# Patient Record
Sex: FEMALE | Race: WHITE | Employment: OTHER | URBAN - NONMETROPOLITAN AREA
[De-identification: names, ages, dates, MRNs, and addresses within clinical notes are randomized per-mention and may not be internally consistent; named-entity substitution may affect disease eponyms.]

---

## 2019-06-15 NOTE — PROGRESS NOTES
[place] [facility] Sleep [facility] Critical Care [facility]                                                                                        Sleep Medicine Initial Consultation    [facility]                                             Chief Complaint: Talia Warren is a new sleep consult referred by Jose Lawson for snoring     Chief complaint: Kenisha Harrell is a 79 y. o.oldfemale came for further evaluation regarding her ?sleep apnea  with referral from JUANI Levine- Dr. Meir Dumont, [facility]- Cardiologist.    [place]:    Sleep/Wake schedule:  Usual time to go to bed during the work/regular day of week: 9:00 PM.  Usual time to wake up during the work//regular day of week: 5:30 AM.    Over the weekends her sleep schedule:   [x]phase delayed. 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 She usually falls a sleep in less than: 10 minutes. She takes naps: Yes  Number of naps per week:  1 to 2 times in summer months  During each nap she spends a total of: 30 to 45 Minutes. The naps were reported as refreshing: Yes. Sleep Hygiene:    Is the temperature and evironment in her bed room is acceptable to her: Yes. She watches Television in her bed room: Yes. She read books, study, pay bills etc in the bed: Yes. Frequency She wake up during night/sleep: 1 to 2  Majority of nocturnal awakenings are for urination: Yes. Difficulty in falling back to sleep after nocturnal awakenings: No  .  Do you drink coffee: No.        Do you drink caffeinated beverages i.e sodas: Yes. 2 can/s per week. Do you drink tea:No.   Do you drink alcoholic beverages: Yes. 0.5 drink per month. History of recreational drug use: No.     Sleep apnea symptoms:  Noticed to have loud snoring:Yes. Noted by her family member- spouse and several of her family members.   Witnessed apneas during sleep noticed: Yes- occasionally. Noted by her family member- spouse. History of choking and gasping sensation at night time: No.  History of headaches in the morning:Yes- occasionally  History of dry mouth in the morning: Yes. History of palpitations during night time/nocturnal awakenings: Yes- rarely  History of sweating during night time/nocturnal awakenings: No    General:  History of head injury in the past: No.    History of seizures: No.   Rest less legs syndrome symptoms:NO  History suggestive of periodic limb movements during sleep: NO  History suggestive of hypnagogic hallucinations: NO  History suggestive of hypnopompic hallucinations: NO  History suggestive of sleep talking:NO  History suggestive of sleep walking:NO  History suggestive of bruxism: No.   History suggestive of cataplexy: NO  History suggestive of sleep paralysis: NO    Family history of sleep disorders:  Family history of obstructive sleep apnea: No.  Family history of Narcolepsy: No.   Family history of Rest less legs syndrome : Yes. Family member diagnosed with Restless legs syndrome. History regarding old sleep studies:  Prior history of sleep study: No.  Using CPAP device: No.  Currently using home Oxygen: NO.      Patient considerations:  Is the patient is ambulatory: Yes  Patient is currently using: None of these Wheelchair, Adelia Raymond or [employer]. Para/Quadriplegic: NO  Hearing deficit : NO  Claustrophobic: NO  MDD : NO  Blind: NO  Incontinent: NO  Para/Quadraplegi: NO.   Need transportation to and from Sleep Center:NO      Social History:  Social History     Tobacco Use    Smoking status: Never Smoker    Smokeless tobacco: Never Used   Substance Use Topics    Alcohol use: Yes     Comment: rarely drinks wine    Drug use: Never   . She is currently working: Yes. 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   She usually goes to her work at:  7:15AM.   She completes her work at:  3:30PM. Past Medical History:   Diagnosis Date    Hypertension     Palpitation     Skin cancer     Snoring        Past Surgical History:   Procedure Laterality Date    DILATION AND CURETTAGE OF UTERUS      SKIN CANCER EXCISION      TONSILLECTOMY AND ADENOIDECTOMY         No Known Allergies    Current Outpatient Medications   Medication Sig Dispense Refill    Cholecalciferol (VITAMIN D3) 2000 units CAPS Take 1,000 Units by mouth      Glucosamine-Chondroitin-MSM (TRIPLE FLEX PO) Take by mouth 2 times daily      UNABLE TO FIND Perfect multi super green vitamin tid      losartan (COZAAR) 25 MG tablet Take 25 mg by mouth daily      calcium carbonate (OSCAL) 500 MG TABS tablet Take 1,200 mg by mouth daily       Multiple Vitamin (STRESSTABS PO) Take by mouth       No current facility-administered medications for this visit. No family history on file. Review of Systems:   General/Constitutional: She gained ~10 to 15lbs weight in the last 1year with normal appetite. No fever or chills. HENT: Negative. Eyes: Negative. Upper respiratory tract: No nasal stuffiness or post nasal drip. Lower respiratory tract/ lungs: No cough or sputum production. No hemoptysis. Cardiovascular: No palpitations or chest pain. Gastrointestinal: No nausea or vomiting. Neurological: No focal neurologiacal weakness. Extremities: No edema. Musculoskeletal: No complaints. Genitourinary: No complaints. Hematological: Negative. Psychiatric/Behavioral: Negative. Skin: No itching. /64 (Site: Left Upper Arm, Position: Sitting, Cuff Size: Medium Adult)   Pulse 80   Ht 5' 2\" (1.575 m)   Wt 123 lb 12.8 oz (56.2 kg)   SpO2 97% Comment: room air at rest  BMI 22.64 kg/m²   Mallampati airway Class: 3  Neck Circumference:.14 Inches  [place] sleepiness score 6/27/19: 12  Saqli:.85    Physical Exam   Nursing note and vitals reviewed.    Constitutional: Patient appears moderately built and moderately nourished. No distress. Patient is oriented to person, place, and time. HENT:   Head: Normocephalic and atraumatic. Right Ear: External ear normal.   Left Ear: External ear normal.   Mouth/Throat: Oropharynx is clear and moist.  No oral thrush. Eyes: Conjunctivae are normal. Pupils are equal, round, and reactive to light. No scleral icterus. Neck: Neck supple. No JVD present. No tracheal deviation present. Cardiovascular: Normal rate, regular rhythm, normal heart sounds. No murmur heard. Pulmonary/Chest: Effort normal and breath sounds normal. No stridor. No respiratory distress. No wheezes. No rales. Patient exhibits no tenderness. Abdominal: Soft. Patient exhibits no distension. No tenderness. Musculoskeletal: Normal range of motion. Extremities: Patient exhibits no edema and no tenderness. Lymphadenopathy:  No cervical adenopathy. Neurological: Patient is alert and oriented to person, place, and time. Skin: Skin is warm and dry. Patient is not diaphoretic. Psychiatric: Patient  has a normal mood and affect. Patient behavior is normal.     Diagnostic Data:  None related sleep. Assessment:  -Snoring with witnessed apneas, nocturnal awakenings and excessive daytime sleepiness to evaluate for obstructive sleep apnea. -Inadequate sleep hygiene.  -Hypertension on meds- under control with meds. -Hypersomnia ( Excessive daytime sleepiness) may be due to obstructive sleep apnea Vs Inadequate sleep hygiene Vs Narcolepsy. Recommendations/Plan:  - Schedule patient for nocturnal polysomnogram (Sleep study) followed by Multiple sleep latency test at [facility] sleep lab to further evaluate for Narcolepsy as an etiology for hypersomnia.  Patient to follow with my [facility] sleep clinic in 1week after the sleep study.  -She was advised to submit 2 weeks of sleep dairy for review of her   sleep schedule.  -I had a discussion with patient regarding avialable treatment options for her sleep disorder breathing including but not limited to CPAP titration in the sleep lab Vs.Dental appliance placement with referral to a local dentist Vs other available surgical options including Uvulopalatopharyngoplasty, maxillomandibular ostomy and tracheostomy as last option. At the end of discussion, she is not decided on her   treatment if she found to have obstructive sleep apnea at this time.  -We will see Marzena Jeffries back in 1week after the sleep study to go over the sleep study results and further management options.  -She was educated to practice good sleep hygiene practices. She  was provided with a good sleep hygiene hand out.  -Marzena was advised to make earlier appointment with my clinic if she develops any worsening of sleep symptoms. She verbalizes understanding.  -Marzena was advised to not to drive any motor vehicles or operate heavy equipment until her sleep symptoms are under good control. Marzena Jeffries verbalizes understanding.  - Marcie Jiménez was educated about my impression and plan. She verbalizes understanding.

## 2019-06-25 RX ORDER — CALCIUM CARBONATE 500(1250)
1200 TABLET ORAL DAILY
COMMUNITY

## 2019-06-25 RX ORDER — LOSARTAN POTASSIUM 25 MG/1
25 TABLET ORAL DAILY
COMMUNITY

## 2019-06-27 ENCOUNTER — INITIAL CONSULT (OUTPATIENT)
Dept: PULMONOLOGY | Age: 69
End: 2019-06-27
Payer: COMMERCIAL

## 2019-06-27 VITALS
BODY MASS INDEX: 22.78 KG/M2 | DIASTOLIC BLOOD PRESSURE: 64 MMHG | SYSTOLIC BLOOD PRESSURE: 102 MMHG | HEIGHT: 62 IN | HEART RATE: 80 BPM | OXYGEN SATURATION: 97 % | WEIGHT: 123.8 LBS

## 2019-06-27 DIAGNOSIS — R06.81 APNEA: ICD-10-CM

## 2019-06-27 DIAGNOSIS — G47.10 HYPERSOMNIA: ICD-10-CM

## 2019-06-27 DIAGNOSIS — I10 ESSENTIAL HYPERTENSION: ICD-10-CM

## 2019-06-27 DIAGNOSIS — R06.83 SNORING: Primary | ICD-10-CM

## 2019-06-27 PROCEDURE — 99204 OFFICE O/P NEW MOD 45 MIN: CPT | Performed by: INTERNAL MEDICINE

## 2019-06-27 RX ORDER — CHOLECALCIFEROL (VITAMIN D3) 125 MCG
1 CAPSULE ORAL
COMMUNITY

## 2019-06-27 NOTE — PROGRESS NOTES
Chief Complaint: Fanny Whaley is a new sleep consult referred by Brisa Leonardo for snoring    Mallampati airway Class: 3  Neck Circumference:.14 Inches    [place] sleepiness score 6/27/19: 12  Saqli:.85

## 2019-06-27 NOTE — PATIENT INSTRUCTIONS
Recommendations/Plan:  - Schedule patient for nocturnal polysomnogram (Sleep study) followed by Multiple sleep latency test at [facility] sleep lab to further evaluate for Narcolepsy as an etiology for hypersomnia. Patient to follow with my [facility] sleep clinic in 1week after the sleep study.  -She was advised to submit 2 weeks of sleep dairy for review of her   sleep schedule.  -I had a discussion with patient regarding avialable treatment options for her sleep disorder breathing including but not limited to CPAP titration in the sleep lab Vs.Dental appliance placement with referral to a local dentist Vs other available surgical options including Uvulopalatopharyngoplasty, maxillomandibular ostomy and tracheostomy as last option. At the end of discussion, she is not decided on her   treatment if she found to have obstructive sleep apnea at this time.  -We will see Marzena Jeffries back in 1week after the sleep study to go over the sleep study results and further management options.  -She was educated to practice good sleep hygiene practices. She  was provided with a good sleep hygiene hand out.  -Marzena was advised to make earlier appointment with my clinic if she develops any worsening of sleep symptoms. She verbalizes understanding.  -Marzena was advised to not to drive any motor vehicles or operate heavy equipment until her sleep symptoms are under good control. Marzena Jeffries verbalizes understanding.  - Georgiana Santiago was educated about my impression and plan. She verbalizes understanding.

## 2019-07-02 ENCOUNTER — TELEPHONE (OUTPATIENT)
Dept: PULMONOLOGY | Age: 69
End: 2019-07-02

## 2019-07-02 NOTE — TELEPHONE ENCOUNTER
Please cancel MSLT portion of the test and continue with base line sleep study. No need for new order. Keep follow up appt.

## 2019-07-26 DIAGNOSIS — R06.83 SNORING: ICD-10-CM

## 2019-07-26 DIAGNOSIS — I10 ESSENTIAL HYPERTENSION: ICD-10-CM

## 2019-07-26 DIAGNOSIS — G47.10 HYPERSOMNIA: ICD-10-CM

## 2019-07-26 DIAGNOSIS — R06.81 APNEA: ICD-10-CM

## 2019-08-14 ENCOUNTER — OFFICE VISIT (OUTPATIENT)
Dept: PULMONOLOGY | Age: 69
End: 2019-08-14
Payer: COMMERCIAL

## 2019-08-14 VITALS
HEIGHT: 62 IN | BODY MASS INDEX: 22.93 KG/M2 | WEIGHT: 124.6 LBS | OXYGEN SATURATION: 97 % | SYSTOLIC BLOOD PRESSURE: 122 MMHG | DIASTOLIC BLOOD PRESSURE: 80 MMHG | HEART RATE: 63 BPM

## 2019-08-14 DIAGNOSIS — G47.33 OSA (OBSTRUCTIVE SLEEP APNEA): Primary | ICD-10-CM

## 2019-08-14 DIAGNOSIS — I10 BENIGN ESSENTIAL HTN: ICD-10-CM

## 2019-08-14 PROCEDURE — 99214 OFFICE O/P EST MOD 30 MIN: CPT | Performed by: NURSE PRACTITIONER

## 2019-08-14 RX ORDER — PREDNISONE 10 MG/1
10 TABLET ORAL DAILY
COMMUNITY
End: 2020-02-05

## 2019-08-14 NOTE — PROGRESS NOTES
[place] for Pulmonary, CriticalCare and Sleep Medicine    Garret Ramirez, 79 y.o.  [id number]      Pt of [facility]  Nurses Notes   Marzena is here for a PSG follow up. Study Results  Initial Study Date -  7/23/19  AHI -  48.4    TotalEvents - 372  (Apneas  207  Hypopneas 165  Central  0)  LM w/Arousals - 0  Sleep Efficiency - 89.7 % (Total Sleep Time - 461 min)  Time with Sats below 88% - 35.8 min    Interval History       Garret Ramirez is a 79 y.o. old female who comes in to review the results of her recent sleep study, to answer questions and to explore options for treatment. she continues to have symptoms of snoring, snorting, periods of not breathing, tossing and turning, excessive daytime sleepiness. She was referred by Cardiology for history of HTN and reports of loud snoring and EDS. PMHx  Past Medical History:   Diagnosis Date    Hypertension     Palpitation     Skin cancer     Snoring      Past Surgical History:   Procedure Laterality Date    DILATION AND CURETTAGE OF UTERUS      SKIN CANCER EXCISION      TONSILLECTOMY AND ADENOIDECTOMY       Social History     Tobacco Use    Smoking status: Never Smoker    Smokeless tobacco: Never Used   Substance Use Topics    Alcohol use: Yes     Comment: rarely drinks wine    Drug use: Never     No family history on file. Allergies  No Known Allergies  Meds  Current Outpatient Medications   Medication Sig Dispense Refill    Cholecalciferol (VITAMIN D3) 2000 units CAPS Take 1,000 Units by mouth      Glucosamine-Chondroitin-MSM (TRIPLE FLEX PO) Take by mouth 2 times daily      UNABLE TO FIND Perfect multi super green vitamin tid      losartan (COZAAR) 25 MG tablet Take 25 mg by mouth daily      Multiple Vitamin (STRESSTABS PO) Take by mouth      calcium carbonate (OSCAL) 500 MG TABS tablet Take 1,200 mg by mouth daily        No current facility-administered medications for this visit.       ROS  Review of Systems  General/Constitutional: No recent loss of weight or appetite changes. No fever or chills. HENT: Negative. Eyes: Negative. Upper respiratory tract: No nasal stuffiness or post nasal drip. Lower respiratory tract/ lungs: No cough or sputum production. No hemoptysis. Cardiovascular: No palpitations or chest pain. Gastrointestinal: No nausea or vomiting. Neurological: No focal neurologiacal weakness. Extremities: No edema. Musculoskeletal: No complaints. Genitourinary: No complaints. Hematological: Negative. Psychiatric/Behavioral: Negative. Skin: No itching. Exam  Vitals -  /80 (Site: Left Upper Arm, Position: Sitting, Cuff Size: Medium Adult)   Pulse 63   Ht 5' 2\" (1.575 m)   Wt 124 lb 9.6 oz (56.5 kg)   SpO2 97% Comment: on RA  BMI 22.79 kg/m²    Body mass index is 22.79 kg/m². Oxygen level - Room Air  Physical Exam     General Appearance Thin built, moderately nourished, in no acute distress. HEENT - Head is normocephalic, atraumatic. PERRL. Oral mucosa pink and moist, no oral thrush. Mallampati Score - III (soft palate, base of uvula visible). Neck - Supple, symmetrical, trachea midline and soft. Lungs - Chest symmetric with normal A/P diameter, normal respiratory rate and rhythm, lungs clear to auscultation, no wheezes, rales or rhonchi, aeration good. Cardiovascular - Heart sounds are normal. Regular rhythm normal rate without murmur, gallop or rub. Abdomen - Soft, nontender, non-distended. Neurologic - Alert and oriented x 3. Skin - No bruising or bleeding. Extremities - No cyanosis, clubbing or edema. Assessment   Diagnosis Orders   1. LILIANA (obstructive sleep apnea)  Sleep Study with PAP Titration    02 desaturation  Snoring  EDS ESS 12   2. Benign essential HTN        Recommendations  I reviewed the sleep study results in detail with Marzena. We discussed various treatment options including positional therapy, OAT and PAP therapy along with the benefits and limitations of each.  Based on the severity of her

## 2019-08-29 ENCOUNTER — TELEPHONE (OUTPATIENT)
Dept: PULMONOLOGY | Age: 69
End: 2019-08-29

## 2019-09-05 DIAGNOSIS — G47.33 OSA (OBSTRUCTIVE SLEEP APNEA): ICD-10-CM

## 2020-02-05 ENCOUNTER — OFFICE VISIT (OUTPATIENT)
Dept: PULMONOLOGY | Age: 70
End: 2020-02-05
Payer: COMMERCIAL

## 2020-02-05 VITALS
OXYGEN SATURATION: 98 % | WEIGHT: 131.8 LBS | HEIGHT: 62 IN | HEART RATE: 78 BPM | BODY MASS INDEX: 24.25 KG/M2 | DIASTOLIC BLOOD PRESSURE: 70 MMHG | SYSTOLIC BLOOD PRESSURE: 120 MMHG

## 2020-02-05 PROCEDURE — 99213 OFFICE O/P EST LOW 20 MIN: CPT | Performed by: NURSE PRACTITIONER

## 2020-02-05 NOTE — PROGRESS NOTES
[place] for Pulmonary Medicine and [facility]         [id number]  2/5/2020   Chief Complaint   Patient presents with    Follow-up     8 week follow up with a download        Pt of Dr. Kareem DAVE Download:   Robin Carter or initial AHI: 48.4   Date of initial study: 7/23/19  Weight of initial study: 123 lb  [x] Compliant  100%   [] Noncompliant 0%     PAP Type CPAP Level  9 cmH20   Avg Hrs/Day 7:27  AHI: 0.3   Recorded compliance dates, 1/4/20 to 2/2/20   Machine/Mfg: ResMed  Interface: nasal pillows    Provider:  [][facility]  [x][facility] [][facility]  [][facility]         [][facility] []Other:     Neck Size: 14  Mallampati Mallampati 3  ESS:  4 (down from 12)    Here is a scan of the most recent download:            Presentation:   Teena Carpenter presents for sleep medicine follow up for obstructive sleep apnea. Since the last visit, Teena Carpenter has started on CPAP and is doing exceptionally well. She \"loves it\". Sleeping all night, improved EDS from 12 to 4. Referred due to HTN, remains controlled. Equipment issues: The pressure is acceptable, the mask is acceptable and she is using the humidity. Sleep issues:  Do you feel better? Yes  More rested? Yes   Better concentration? yes    Progress History:   Since last visit any new medical issues? No  New ER or hospitlal visits? No  Any new or changes in medicines? No  Any new sleep medicines?  No      Past Medical History:   Diagnosis Date    Hypertension     LILIANA on CPAP     Palpitation     Skin cancer     Snoring        Past Surgical History:   Procedure Laterality Date    DILATION AND CURETTAGE OF UTERUS      SKIN CANCER EXCISION      TONSILLECTOMY AND ADENOIDECTOMY         Social History     Tobacco Use    Smoking status: Never Smoker    Smokeless tobacco: Never Used   Substance Use Topics    Alcohol use: Yes     Comment: rarely drinks wine    Drug use: Never       No Known Allergies    Current Outpatient Medications   Medication Sig Dispense Refill    predniSONE (DELTASONE) 10 MG tablet Take 10 mg by mouth daily      Cholecalciferol (VITAMIN D3) 2000 units CAPS Take 1,000 Units by mouth      Glucosamine-Chondroitin-MSM (TRIPLE FLEX PO) Take by mouth 2 times daily      UNABLE TO FIND Perfect multi super green vitamin tid      losartan (COZAAR) 25 MG tablet Take 25 mg by mouth daily      Multiple Vitamin (STRESSTABS PO) Take by mouth      calcium carbonate (OSCAL) 500 MG TABS tablet Take 1,200 mg by mouth daily        No current facility-administered medications for this visit. No family history on file. Review of Systems   General/Constitutional: No recent loss of weight or appetite changes. No fever or chills. HENT: Negative. Eyes: Negative. Upper respiratory tract: No nasal stuffiness or post nasal drip. Lower respiratory tract/ lungs: No cough or sputum production. No hemoptysis. Cardiovascular: No palpitations or chest pain. Gastrointestinal: No nausea or vomiting. Neurological: No focal neurologiacal weakness. Extremities: No edema. Musculoskeletal: No complaints. Genitourinary: No complaints. Hematological: Negative. Psychiatric/Behavioral: Negative. Skin: No itching. Physical Exam:    BMI: Body mass index is 24.11 kg/m². Wt Readings from Last 3 Encounters:   02/05/20 131 lb 12.8 oz (59.8 kg)   08/14/19 124 lb 9.6 oz (56.5 kg)   06/27/19 123 lb 12.8 oz (56.2 kg)     Weight stable / unchanged  Vitals: /70 (Site: Left Upper Arm, Position: Sitting, Cuff Size: Medium Adult)   Pulse 78   Ht 5' 2\" (1.575 m)   Wt 131 lb 12.8 oz (59.8 kg)   SpO2 98% Comment: on room air at rest  BMI 24.11 kg/m²         General Appearance - Moderately built, moderately nourished, in no acute distress. HEENT - Head is normocephalic, atraumatic. PERRL. Oral mucosa pink and moist, no oral thrush. Mallampati Score - III (soft palate, base of uvula visible). Neck - Supple, symmetrical, trachea midline and soft.   Lungs - Clear to auscultation, no wheezes, rales or rhonchi, aeration good. Cardiovascular - Heart sounds are normal. Regular rhythm normal rate without murmur, gallop or rub. Abdomen - Soft, nontender, non-distended. Neurologic - Alert and oriented x 3. Skin - No bruising or bleeding. Extremities - No cyanosis, clubbing or edema. ASSESSMENT/DIAGNOSIS     Diagnosis Orders   1. LILIANA on CPAP              Plan   Do you need any equipment today? No.    -Monitor BP.    - She was advised to continue current positive airway pressure therapy with above described pressure. - She was advised to keep good compliance with current recommended pressure to get optimal results and clinical improvement.  - Recommend 7-9 hours of sleep with PAP treatment. - She was advised to call [employer] regarding supplies if needed.   -She is to call my office for earlier appointment if needed for worsening of sleep symptoms.   - She was instructed on weight loss. - Marzena was educated about my impression and plan and verbalizes understanding. We will see Marzena Raterman back in: 3 months with download.     Electronically signed by DOROTHY Beck CNP on 2/5/2020 at 2:42 PM

## 2020-08-31 ENCOUNTER — TELEPHONE (OUTPATIENT)
Dept: PULMONOLOGY | Age: 70
End: 2020-08-31

## 2020-09-01 NOTE — TELEPHONE ENCOUNTER
ANTONI for pt advising her that she does have to been seen in office for Medicare and to call the office back to schedule something.

## 2020-09-09 ENCOUNTER — OFFICE VISIT (OUTPATIENT)
Dept: PULMONOLOGY | Age: 70
End: 2020-09-09
Payer: MEDICARE

## 2020-09-09 VITALS
BODY MASS INDEX: 22.41 KG/M2 | DIASTOLIC BLOOD PRESSURE: 72 MMHG | HEART RATE: 65 BPM | SYSTOLIC BLOOD PRESSURE: 118 MMHG | HEIGHT: 62 IN | OXYGEN SATURATION: 98 % | WEIGHT: 121.8 LBS

## 2020-09-09 PROCEDURE — G8427 DOCREV CUR MEDS BY ELIG CLIN: HCPCS | Performed by: NURSE PRACTITIONER

## 2020-09-09 PROCEDURE — 1036F TOBACCO NON-USER: CPT | Performed by: NURSE PRACTITIONER

## 2020-09-09 PROCEDURE — G8420 CALC BMI NORM PARAMETERS: HCPCS | Performed by: NURSE PRACTITIONER

## 2020-09-09 PROCEDURE — 99213 OFFICE O/P EST LOW 20 MIN: CPT | Performed by: NURSE PRACTITIONER

## 2020-09-09 PROCEDURE — 4040F PNEUMOC VAC/ADMIN/RCVD: CPT | Performed by: NURSE PRACTITIONER

## 2020-09-09 PROCEDURE — 3017F COLORECTAL CA SCREEN DOC REV: CPT | Performed by: NURSE PRACTITIONER

## 2020-09-09 PROCEDURE — 1123F ACP DISCUSS/DSCN MKR DOCD: CPT | Performed by: NURSE PRACTITIONER

## 2020-09-09 PROCEDURE — 1090F PRES/ABSN URINE INCON ASSESS: CPT | Performed by: NURSE PRACTITIONER

## 2020-09-09 PROCEDURE — G8400 PT W/DXA NO RESULTS DOC: HCPCS | Performed by: NURSE PRACTITIONER

## 2020-09-09 NOTE — PROGRESS NOTES
[facility] and [address] [facility]         [id number]  9/9/2020   Chief Complaint   Patient presents with    Follow-up     LILIANA 3 month follow up with a download        Pt of Dr. Gaviota DAVE Download:   Tomeka Franco or initial AHI: 48.4   Date of initial study: 7/23/19  Weight of initial study: 123  [x] Compliant  100%   [] Noncompliant 0%     PAP Type CPAP Level  9 cmH20   Avg Hrs/Day 7:28  AHI: 0.2   Recorded compliance dates, 8/8/20 to 9/6/20   Machine/Mfg: ResMed  Interface: Nasal Pillows    Provider:  [][facility]  [x]Susannah [][facility]  [][facility]         [][facility] []Other:     Neck Size: 14  Mallampati Mallampati 3  ESS:  3    Here is a scan of the most recent download:            Presentation:   Giselle Infante presents for sleep medicine follow up for obstructive sleep apnea. Since the last visit, Giselle Infante continues to do well with treatment, no complaints. She is now on Medicare and needs face to face for continued payment. Equipment issues: The pressure is acceptable, the mask is acceptable and she is using the humidity. Sleep issues:  Do you feel better? Yes  More rested? Yes   Better concentration? yes    Progress History:   Since last visit any new medical issues? No  New ER or hospitlal visits? No  Any new or changes in medicines? No  Any new sleep medicines?  No      Past Medical History:   Diagnosis Date    Hypertension     LILIANA on CPAP     Palpitation     Skin cancer     Snoring        Past Surgical History:   Procedure Laterality Date    DILATION AND CURETTAGE OF UTERUS      SKIN CANCER EXCISION      TONSILLECTOMY AND ADENOIDECTOMY         Social History     Tobacco Use    Smoking status: Never Smoker    Smokeless tobacco: Never Used   Substance Use Topics    Alcohol use: Yes     Comment: rarely drinks wine    Drug use: Never       No Known Allergies    Current Outpatient Medications   Medication Sig Dispense Refill    Cholecalciferol (VITAMIN D3) 50 MCG (2000 UT) TABS Take 1 capsule by mouth       Glucosamine-Chondroitin-MSM (TRIPLE FLEX PO) Take by mouth 2 times daily      UNABLE TO FIND Perfect multi super green vitamin tid      losartan (COZAAR) 25 MG tablet Take 25 mg by mouth daily      Multiple Vitamin (STRESSTABS PO) Take by mouth      calcium carbonate (OSCAL) 500 MG TABS tablet Take 1,200 mg by mouth daily        No current facility-administered medications for this visit. No family history on file. Review of Systems   General/Constitutional: No recent loss of weight or appetite changes. No fever or chills. HENT: Negative. Eyes: Negative. Upper respiratory tract: No nasal stuffiness or post nasal drip. Lower respiratory tract/ lungs: No cough or sputum production. No hemoptysis. Cardiovascular: No palpitations or chest pain. Gastrointestinal: No nausea or vomiting. Neurological: No focal neurologiacal weakness. Extremities: No edema. Musculoskeletal: No complaints. Genitourinary: No complaints. Hematological: Negative. Psychiatric/Behavioral: Negative. Skin: No itching. Physical Exam:    BMI: Body mass index is 22.28 kg/m². Wt Readings from Last 3 Encounters:   09/09/20 121 lb 12.8 oz (55.2 kg)   02/05/20 131 lb 12.8 oz (59.8 kg)   08/14/19 124 lb 9.6 oz (56.5 kg)     Weight stable / unchanged  Vitals: /72 (Site: Left Upper Arm, Position: Sitting, Cuff Size: Medium Adult)   Pulse 65   Ht 5' 2\" (1.575 m)   Wt 121 lb 12.8 oz (55.2 kg)   SpO2 98% Comment: on room air at rest  BMI 22.28 kg/m²         General Appearance - Moderately built, moderately nourished, in no acute distress. HEENT - Head is normocephalic, atraumatic. PERRL. Oral mucosa pink and moist, no oral thrush. Mallampati Score - III (soft palate, base of uvula visible). Neck - Supple, symmetrical, trachea midline and soft. Lungs - Clear to auscultation, no wheezes, rales or rhonchi, aeration good.   Cardiovascular - Heart sounds are normal. Regular rhythm normal rate without murmur, gallop or rub. Abdomen - Soft, nontender, non-distended. Neurologic - Alert and oriented x 3. Skin - No bruising or bleeding. Extremities - No cyanosis, clubbing or edema. ASSESSMENT/DIAGNOSIS     Diagnosis Orders   1. LILIANA on CPAP              Plan   Do you need any equipment today? No.    - She was advised to continue current positive airway pressure therapy with above described pressure. - She was advised to keep good compliance with current recommended pressure to get optimal results and clinical improvement.  - Recommend 7-9 hours of sleep with PAP treatment. - She was advised to call [employer] regarding supplies if needed.   -She is to call my office for earlier appointment if needed for worsening of sleep symptoms.   - She was instructed on weight loss. - Marzena was educated about my impression and plan and verbalizes understanding. We will see Nemo Hemphill back in: 1 year with download.     Electronically signed by DOROTHY Reno CNP on 9/9/2020 at 2:15 PM

## 2020-09-14 ENCOUNTER — TELEPHONE (OUTPATIENT)
Dept: PULMONOLOGY | Age: 70
End: 2020-09-14

## 2020-09-14 NOTE — TELEPHONE ENCOUNTER
Pt wanted to thank you for helping her get her PAP machine figured out with the [employer] company. She greatly appreciates it and they are letting her do the pay off.

## 2021-01-28 ENCOUNTER — TELEPHONE (OUTPATIENT)
Dept: PULMONOLOGY | Age: 71
End: 2021-01-28

## 2021-01-28 DIAGNOSIS — Z99.89 OSA ON CPAP: Primary | ICD-10-CM

## 2021-01-28 DIAGNOSIS — G47.33 OSA ON CPAP: Primary | ICD-10-CM

## 2021-01-28 NOTE — TELEPHONE ENCOUNTER
Patient is switching from Shirlean Adie to Normal but there is no supply order. Could you please place one.

## 2021-09-22 ENCOUNTER — OFFICE VISIT (OUTPATIENT)
Dept: PULMONOLOGY | Age: 71
End: 2021-09-22
Payer: MEDICARE

## 2021-09-22 VITALS
SYSTOLIC BLOOD PRESSURE: 116 MMHG | HEIGHT: 62 IN | HEART RATE: 59 BPM | WEIGHT: 124 LBS | TEMPERATURE: 97.8 F | OXYGEN SATURATION: 98 % | DIASTOLIC BLOOD PRESSURE: 72 MMHG | BODY MASS INDEX: 22.82 KG/M2

## 2021-09-22 DIAGNOSIS — G47.33 OSA ON CPAP: Primary | ICD-10-CM

## 2021-09-22 DIAGNOSIS — Z99.89 OSA ON CPAP: Primary | ICD-10-CM

## 2021-09-22 PROCEDURE — G8427 DOCREV CUR MEDS BY ELIG CLIN: HCPCS | Performed by: NURSE PRACTITIONER

## 2021-09-22 PROCEDURE — 1123F ACP DISCUSS/DSCN MKR DOCD: CPT | Performed by: NURSE PRACTITIONER

## 2021-09-22 PROCEDURE — 4040F PNEUMOC VAC/ADMIN/RCVD: CPT | Performed by: NURSE PRACTITIONER

## 2021-09-22 PROCEDURE — 99212 OFFICE O/P EST SF 10 MIN: CPT | Performed by: NURSE PRACTITIONER

## 2021-09-22 PROCEDURE — G8400 PT W/DXA NO RESULTS DOC: HCPCS | Performed by: NURSE PRACTITIONER

## 2021-09-22 PROCEDURE — 1036F TOBACCO NON-USER: CPT | Performed by: NURSE PRACTITIONER

## 2021-09-22 PROCEDURE — 1090F PRES/ABSN URINE INCON ASSESS: CPT | Performed by: NURSE PRACTITIONER

## 2021-09-22 PROCEDURE — G8420 CALC BMI NORM PARAMETERS: HCPCS | Performed by: NURSE PRACTITIONER

## 2021-09-22 PROCEDURE — 3017F COLORECTAL CA SCREEN DOC REV: CPT | Performed by: NURSE PRACTITIONER

## 2021-09-22 RX ORDER — M-VIT,TX,IRON,MINS/CALC/FOLIC 27MG-0.4MG
1 TABLET ORAL DAILY
COMMUNITY

## 2021-09-22 ASSESSMENT — ENCOUNTER SYMPTOMS
ALLERGIC/IMMUNOLOGIC NEGATIVE: 1
ABDOMINAL PAIN: 0
WHEEZING: 0
COUGH: 0
SHORTNESS OF BREATH: 0
NAUSEA: 0
EYES NEGATIVE: 1
DIARRHEA: 0
VOMITING: 0

## 2021-09-22 NOTE — PROGRESS NOTES
[facility]      Yasmani Boles         [id number]  9/22/2021   Chief Complaint   Patient presents with    Follow-up     LILIANA 1 year sleep follow up with Cherrie Houser Pt of Dr. Mary Lizarraga     PAP Download:   Original or initial AHI: 48.4     Date of initial study: 7/23/2019      Compliant  97%     Noncompliant 3 %     PAP Type airsense 10 Level  9 cmlh2o    Avg Hrs/Day 7:58  AHI: 0.2   Recorded compliance dates , 8/21/2021-9/19/2021   Machine/Mfg:   [x] ResMed    [] Respironics/Dreamstation   Interface:   [] Nasal    [x] Nasal pillows   [] FFM      Provider:      [] -RUBI     [x]Susannah     [] Jackie    [] Neeru Amaya    [] Jaleesa               [] [facility]      [] [employer]    [] [address]      [] Other    Neck Size: 14  Mallampati Mallampati 3  ESS:  2  SAQLI: 98  Here is a scan of the most recent download:            Presentation:   Shonda Zamora presents for 1 year sleep medicine follow up for obstructive sleep apnea  Since the last visit, Shonda Zamora is using her CPAP compliantly  ,has not complaints       Equipment issues: The pressure is  acceptable, the mask is acceptable     Sleep issues:  Do you feel better? Yes  More rested? Yes   Better concentration? yes    Progress History:   Since last visit any new medical issues? No  New ER or hospital visits? no  Any new or changes in medicines? No  Any new sleep medicines? No    Review of Systems -   Review of Systems   Constitutional: Negative for activity change, appetite change, chills, fever and unexpected weight change. HENT: Negative. Eyes: Negative. Respiratory: Negative for cough, shortness of breath and wheezing. Cardiovascular: Negative for chest pain, palpitations and leg swelling. Gastrointestinal: Negative for abdominal pain, diarrhea, nausea and vomiting. Genitourinary: Negative. Musculoskeletal: Negative. Skin: Negative. Allergic/Immunologic: Negative. Neurological: Negative. Psychiatric/Behavioral: Negative. Negative for sleep disturbance. Physical Exam:    BMI:  Body mass index is 22.68 kg/m². Wt Readings from Last 3 Encounters:   09/22/21 124 lb (56.2 kg)   09/09/20 121 lb 12.8 oz (55.2 kg)   02/05/20 131 lb 12.8 oz (59.8 kg)     Weight lost 10  lbs over 6 months   Vitals: /72 (Site: Left Upper Arm, Position: Sitting)   Pulse 59   Temp 97.8 °F (36.6 °C)   Ht 5' 2\" (1.575 m)   Wt 124 lb (56.2 kg)   SpO2 98% Comment: on RA  BMI 22.68 kg/m²       Physical Exam  Vitals and nursing note reviewed. Constitutional:       Appearance: Normal appearance. HENT:      Head: Normocephalic and atraumatic. Mouth/Throat:      Mouth: Mucous membranes are moist.      Pharynx: Oropharynx is clear. Eyes:      Conjunctiva/sclera: Conjunctivae normal.   Pulmonary:      Effort: Pulmonary effort is normal. No tachypnea, bradypnea or respiratory distress. Skin:     General: Skin is warm and dry. Findings: No erythema or rash. Neurological:      Mental Status: She is alert and oriented to person, place, and time. Psychiatric:         Attention and Perception: Attention normal.         Mood and Affect: Mood normal.         Speech: Speech normal.         Behavior: Behavior normal.         Thought Content: Thought content normal.         Cognition and Memory: Cognition normal.         Judgment: Judgment normal.           ASSESSMENT/DIAGNOSIS     Diagnosis Orders   1. LILIANA on CPAP  DME Order for CPAP as OP            Plan   Do you need any equipment today? Yes - [employer] FOR UDATED SUPPLIES  - Download reviewed and discussed with patient  - She  was advised to continue current positive airway pressure therapy with above described pressure. - She  advised to keep good compliance with current recommended pressure to get optimal results and clinical improvement  - Recommend 7-9 hours of sleep with PAP  - She was advised to call DME company regarding supplies if needed. -She call my office for earlier appointment if needed for worsening of sleep symptoms.   - She was instructed on weight loss  - Marzena was educated about my impression and plan. Patient verbalizesunderstanding.   We will see Ai Del Cid back in: 1 year with download    Information added by my medical assistant/LPN was reviewed today    Electronically signed by DOROTHY Bell CNP on 9/22/2021 at 11:02 AM

## 2022-09-28 ENCOUNTER — OFFICE VISIT (OUTPATIENT)
Dept: PULMONOLOGY | Age: 72
End: 2022-09-28
Payer: MEDICARE

## 2022-09-28 VITALS
HEIGHT: 62 IN | BODY MASS INDEX: 23.77 KG/M2 | DIASTOLIC BLOOD PRESSURE: 78 MMHG | SYSTOLIC BLOOD PRESSURE: 116 MMHG | OXYGEN SATURATION: 98 % | WEIGHT: 129.2 LBS | HEART RATE: 62 BPM

## 2022-09-28 DIAGNOSIS — Z99.89 OSA ON CPAP: Primary | ICD-10-CM

## 2022-09-28 DIAGNOSIS — G47.33 OSA ON CPAP: Primary | ICD-10-CM

## 2022-09-28 PROCEDURE — 1123F ACP DISCUSS/DSCN MKR DOCD: CPT | Performed by: NURSE PRACTITIONER

## 2022-09-28 PROCEDURE — 3017F COLORECTAL CA SCREEN DOC REV: CPT | Performed by: NURSE PRACTITIONER

## 2022-09-28 PROCEDURE — 99213 OFFICE O/P EST LOW 20 MIN: CPT | Performed by: NURSE PRACTITIONER

## 2022-09-28 PROCEDURE — 1090F PRES/ABSN URINE INCON ASSESS: CPT | Performed by: NURSE PRACTITIONER

## 2022-09-28 PROCEDURE — G8427 DOCREV CUR MEDS BY ELIG CLIN: HCPCS | Performed by: NURSE PRACTITIONER

## 2022-09-28 PROCEDURE — 1036F TOBACCO NON-USER: CPT | Performed by: NURSE PRACTITIONER

## 2022-09-28 PROCEDURE — G8420 CALC BMI NORM PARAMETERS: HCPCS | Performed by: NURSE PRACTITIONER

## 2022-09-28 PROCEDURE — G8400 PT W/DXA NO RESULTS DOC: HCPCS | Performed by: NURSE PRACTITIONER

## 2022-09-28 RX ORDER — ALENDRONATE SODIUM 70 MG/1
70 TABLET ORAL
COMMUNITY

## 2022-09-28 ASSESSMENT — ENCOUNTER SYMPTOMS
NAUSEA: 0
EYES NEGATIVE: 1
ABDOMINAL PAIN: 0
WHEEZING: 0
DIARRHEA: 0
SHORTNESS OF BREATH: 0
VOMITING: 0
COUGH: 0

## 2022-09-28 NOTE — PROGRESS NOTES
[facility]      [facility]         [id number]  9/28/2022   Chief Complaint   Patient presents with    Follow-up     1yr LILIANA        Pt of Dr. Burak Aparicio    PAP Download:   Rhea Sow or initial AHI: 48.4     Date of initial study: 7/23/2019     Compliant  97%     Noncompliant 3 %     PAP Type CPAP Level  9   Avg Hrs/Day 8.5  AHI: 0.2   Recorded compliance dates : 8/27/22-9/25/22  Machine/Mfg:   [x] ResMed    [] Respironics/Dreamstation   Interface:   [] Nasal    [x] Nasal pillows   [] FFM      Provider:      [] [facility]     [x]Susannah     [] [employer]    [] [address]    [] [place]               [] [facility]      [] [employer]    [] [address]      [] Other    Neck Size: 14.5  Mallampati 3  ESS:  2  SAQLI: 95    Here is a scan of the most recent download:              Presentation:   Vishal Calvillo presents for 1 yearsleep medicine follow up for obstructive sleep apnea  Since the last visit, Vishal Calvillo doing well on PAP with no complaints. Occ. Headaches from neck arthritis. Headgear not bothersome     Equipment issues: The pressure is  acceptable, the mask is acceptable     Progress History:   Since last visit any new medical issues? Yes osteopenia   Sleep Related Issues? No  New ER or hospital visits? No  Any new or changes in medicines? Yes on Fosamax  Any new sleep medicines? No    Review of Systems -   Review of Systems   Constitutional:  Negative for activity change, appetite change, chills, fatigue, fever and unexpected weight change. HENT: Negative. Eyes: Negative. Respiratory:  Negative for cough, shortness of breath and wheezing. Cardiovascular:  Negative for chest pain, palpitations and leg swelling. Gastrointestinal:  Negative for abdominal pain, diarrhea, nausea and vomiting. Genitourinary: Negative. Musculoskeletal:  Positive for neck pain. Skin: Negative. Neurological:  Positive for headaches. Hematological: Negative. Psychiatric/Behavioral: Negative. Physical Exam:    BMI:  Body mass index is 23.63 kg/m². Wt Readings from Last 3 Encounters:   09/28/22 129 lb 3.2 oz (58.6 kg)   09/22/21 124 lb (56.2 kg)   09/09/20 121 lb 12.8 oz (55.2 kg)     Weight stable / unchanged  Vitals: /78 (Site: Left Upper Arm, Position: Sitting, Cuff Size: Medium Adult)   Pulse 62   Ht 5' 2\" (1.575 m)   Wt 129 lb 3.2 oz (58.6 kg)   SpO2 98%   BMI 23.63 kg/m²       Physical Exam  Vitals and nursing note reviewed. Constitutional:       Appearance: Normal appearance. She is normal weight. HENT:      Head: Normocephalic and atraumatic. Mouth/Throat:      Pharynx: Oropharynx is clear. Eyes:      Conjunctiva/sclera: Conjunctivae normal.   Pulmonary:      Effort: Pulmonary effort is normal. No tachypnea, bradypnea or respiratory distress. Skin:     Findings: No erythema or rash. Neurological:      Mental Status: She is alert and oriented to person, place, and time. Psychiatric:         Attention and Perception: Attention normal.         Mood and Affect: Mood normal.         Speech: Speech normal.         Behavior: Behavior normal.         Thought Content: Thought content normal.         Cognition and Memory: Cognition normal.         Judgment: Judgment normal.         ASSESSMENT/DIAGNOSIS     Diagnosis Orders   1. LILIANA on CPAP  DME Order for CPAP as OP               Plan   Do you need any equipment today? Yes -printed rx for supplies, faxed to patient's DME     - Download reviewed and discussed with patient  - She  was advised to continue current positive airway pressure therapy with above described pressure.    - She  advised to keep good compliance with current recommended pressure to get optimal results and clinical improvement  - Recommend 7-9 hours of sleep with PAP  - She was advised to call DME company regarding supplies if needed.   -She call my office for earlier appointment if needed for worsening of sleep symptoms.   - She was instructed on weight

## 2023-09-27 ENCOUNTER — OFFICE VISIT (OUTPATIENT)
Dept: PULMONOLOGY | Age: 73
End: 2023-09-27
Payer: MEDICARE

## 2023-09-27 VITALS
WEIGHT: 121 LBS | SYSTOLIC BLOOD PRESSURE: 132 MMHG | OXYGEN SATURATION: 96 % | TEMPERATURE: 97.7 F | BODY MASS INDEX: 22.26 KG/M2 | HEART RATE: 60 BPM | HEIGHT: 62 IN | DIASTOLIC BLOOD PRESSURE: 74 MMHG

## 2023-09-27 DIAGNOSIS — G47.33 OSA ON CPAP: Primary | ICD-10-CM

## 2023-09-27 PROBLEM — J32.9 CHRONIC SINUSITIS: Status: ACTIVE | Noted: 2018-01-09

## 2023-09-27 PROCEDURE — G8420 CALC BMI NORM PARAMETERS: HCPCS | Performed by: NURSE PRACTITIONER

## 2023-09-27 PROCEDURE — 1090F PRES/ABSN URINE INCON ASSESS: CPT | Performed by: NURSE PRACTITIONER

## 2023-09-27 PROCEDURE — 3017F COLORECTAL CA SCREEN DOC REV: CPT | Performed by: NURSE PRACTITIONER

## 2023-09-27 PROCEDURE — 1123F ACP DISCUSS/DSCN MKR DOCD: CPT | Performed by: NURSE PRACTITIONER

## 2023-09-27 PROCEDURE — 1036F TOBACCO NON-USER: CPT | Performed by: NURSE PRACTITIONER

## 2023-09-27 PROCEDURE — G8400 PT W/DXA NO RESULTS DOC: HCPCS | Performed by: NURSE PRACTITIONER

## 2023-09-27 PROCEDURE — 99214 OFFICE O/P EST MOD 30 MIN: CPT | Performed by: NURSE PRACTITIONER

## 2023-09-27 PROCEDURE — G8427 DOCREV CUR MEDS BY ELIG CLIN: HCPCS | Performed by: NURSE PRACTITIONER

## 2023-09-27 ASSESSMENT — ENCOUNTER SYMPTOMS
COUGH: 0
VOMITING: 0
NAUSEA: 0
DIARRHEA: 0
ABDOMINAL PAIN: 0
EYES NEGATIVE: 1
SHORTNESS OF BREATH: 0
WHEEZING: 0

## 2024-09-25 ENCOUNTER — OFFICE VISIT (OUTPATIENT)
Dept: PULMONOLOGY | Age: 74
End: 2024-09-25
Payer: MEDICARE

## 2024-09-25 VITALS
WEIGHT: 124.8 LBS | HEART RATE: 60 BPM | TEMPERATURE: 98 F | BODY MASS INDEX: 22.97 KG/M2 | DIASTOLIC BLOOD PRESSURE: 68 MMHG | HEIGHT: 62 IN | OXYGEN SATURATION: 94 % | SYSTOLIC BLOOD PRESSURE: 118 MMHG

## 2024-09-25 DIAGNOSIS — G47.33 OSA ON CPAP: Primary | ICD-10-CM

## 2024-09-25 PROCEDURE — 3017F COLORECTAL CA SCREEN DOC REV: CPT | Performed by: NURSE PRACTITIONER

## 2024-09-25 PROCEDURE — G8420 CALC BMI NORM PARAMETERS: HCPCS | Performed by: NURSE PRACTITIONER

## 2024-09-25 PROCEDURE — 3078F DIAST BP <80 MM HG: CPT | Performed by: NURSE PRACTITIONER

## 2024-09-25 PROCEDURE — G8427 DOCREV CUR MEDS BY ELIG CLIN: HCPCS | Performed by: NURSE PRACTITIONER

## 2024-09-25 PROCEDURE — 3074F SYST BP LT 130 MM HG: CPT | Performed by: NURSE PRACTITIONER

## 2024-09-25 PROCEDURE — 1090F PRES/ABSN URINE INCON ASSESS: CPT | Performed by: NURSE PRACTITIONER

## 2024-09-25 PROCEDURE — 1123F ACP DISCUSS/DSCN MKR DOCD: CPT | Performed by: NURSE PRACTITIONER

## 2024-09-25 PROCEDURE — G8400 PT W/DXA NO RESULTS DOC: HCPCS | Performed by: NURSE PRACTITIONER

## 2024-09-25 PROCEDURE — 99214 OFFICE O/P EST MOD 30 MIN: CPT | Performed by: NURSE PRACTITIONER

## 2024-09-25 PROCEDURE — 1036F TOBACCO NON-USER: CPT | Performed by: NURSE PRACTITIONER
